# Patient Record
Sex: MALE | Race: WHITE | NOT HISPANIC OR LATINO | Employment: UNEMPLOYED | URBAN - METROPOLITAN AREA
[De-identification: names, ages, dates, MRNs, and addresses within clinical notes are randomized per-mention and may not be internally consistent; named-entity substitution may affect disease eponyms.]

---

## 2023-09-12 ENCOUNTER — TRANSFERRED RECORDS (OUTPATIENT)
Dept: HEALTH INFORMATION MANAGEMENT | Facility: CLINIC | Age: 53
End: 2023-09-12

## 2023-09-28 ENCOUNTER — TRANSFERRED RECORDS (OUTPATIENT)
Dept: HEALTH INFORMATION MANAGEMENT | Facility: CLINIC | Age: 53
End: 2023-09-28

## 2023-09-28 LAB
ALT SERPL-CCNC: 29 IU/L (ref 7–52)
AST SERPL-CCNC: 73 IU/L (ref 13–39)
CREATININE (EXTERNAL): 1.28 MG/DL (ref 0.7–1.3)
GFR ESTIMATED (EXTERNAL): 59 ML/MIN/1.73M2
GFR ESTIMATED (IF AFRICAN AMERICAN) (EXTERNAL): 72 ML/MIN/1.73M2
GLUCOSE (EXTERNAL): 81 MG/DL (ref 70–105)
POTASSIUM (EXTERNAL): 5.1 MEQ/L (ref 3.5–5.1)

## 2023-10-03 ENCOUNTER — TRANSFERRED RECORDS (OUTPATIENT)
Dept: HEALTH INFORMATION MANAGEMENT | Facility: CLINIC | Age: 53
End: 2023-10-03

## 2023-10-03 LAB
ALT SERPL-CCNC: 35 IU/L (ref 7–52)
AST SERPL-CCNC: 78 IU/L (ref 13–39)
CREATININE (EXTERNAL): 1.31 MG/DL (ref 0.7–1.3)
GFR ESTIMATED (EXTERNAL): 57 ML/MIN/1.73M2
GFR ESTIMATED (IF AFRICAN AMERICAN) (EXTERNAL): 70 ML/MIN/1.73M2
GLUCOSE (EXTERNAL): 81 MG/DL (ref 70–105)
POTASSIUM (EXTERNAL): 5.9 MEQ/L (ref 3.5–5.1)

## 2023-10-09 ENCOUNTER — TRANSFERRED RECORDS (OUTPATIENT)
Dept: HEALTH INFORMATION MANAGEMENT | Facility: CLINIC | Age: 53
End: 2023-10-09

## 2023-10-10 ENCOUNTER — TRANSFERRED RECORDS (OUTPATIENT)
Dept: HEALTH INFORMATION MANAGEMENT | Facility: CLINIC | Age: 53
End: 2023-10-10

## 2023-10-10 LAB
ALT SERPL-CCNC: 49 IU/L (ref 7–52)
AST SERPL-CCNC: 86 U/L (ref 13–39)
CREATININE (EXTERNAL): 1.06 MG/DL (ref 0.7–1.3)
GFR ESTIMATED (EXTERNAL): 78 ML/MIN/1.73M2
GFR ESTIMATED (IF AFRICAN AMERICAN) (EXTERNAL): 89 ML/MIN/1.73M2
GLUCOSE (EXTERNAL): 98 MG/DL (ref 70–105)
POTASSIUM (EXTERNAL): 4.1 MEQ/L (ref 3.5–5.1)

## 2023-10-11 ENCOUNTER — TRANSFERRED RECORDS (OUTPATIENT)
Dept: HEALTH INFORMATION MANAGEMENT | Facility: CLINIC | Age: 53
End: 2023-10-11

## 2023-10-11 ENCOUNTER — MEDICAL CORRESPONDENCE (OUTPATIENT)
Dept: HEALTH INFORMATION MANAGEMENT | Facility: CLINIC | Age: 53
End: 2023-10-11

## 2023-10-12 ENCOUNTER — TRANSFERRED RECORDS (OUTPATIENT)
Dept: HEALTH INFORMATION MANAGEMENT | Facility: CLINIC | Age: 53
End: 2023-10-12

## 2023-10-16 ENCOUNTER — TRANSFERRED RECORDS (OUTPATIENT)
Dept: HEALTH INFORMATION MANAGEMENT | Facility: CLINIC | Age: 53
End: 2023-10-16

## 2023-10-16 LAB — EJECTION FRACTION: NORMAL %

## 2023-10-18 ENCOUNTER — TRANSFERRED RECORDS (OUTPATIENT)
Dept: HEALTH INFORMATION MANAGEMENT | Facility: CLINIC | Age: 53
End: 2023-10-18

## 2023-10-18 LAB
ALT SERPL-CCNC: 46 IU/L (ref 7–52)
AST SERPL-CCNC: 65 IU/L (ref 13–39)
CREATININE (EXTERNAL): 1.47 MG/DL (ref 0.7–1.3)
GFR ESTIMATED (EXTERNAL): 50 ML/MIN/1.73M2
GFR ESTIMATED (IF AFRICAN AMERICAN) (EXTERNAL): 61 ML/MIN/1.73M2
GLUCOSE (EXTERNAL): 80 MG/DL (ref 70–105)
POTASSIUM (EXTERNAL): 4.6 MEQ/L (ref 3.5–5.1)
TSH SERPL-ACNC: 1 MIU/ML (ref 0.45–5.33)

## 2023-10-20 ENCOUNTER — REFERRAL (OUTPATIENT)
Dept: TRANSPLANT | Facility: CLINIC | Age: 53
End: 2023-10-20
Payer: COMMERCIAL

## 2023-10-20 DIAGNOSIS — K70.31 ALCOHOLIC CIRRHOSIS OF LIVER WITH ASCITES (H): Primary | ICD-10-CM

## 2023-10-20 NOTE — LETTER
October 24, 2023    Vinicio Chen  [address]  [place] [address]      Dear Vinicio,    Thank you for your interest in the Transplant Center at [facility]. We look forward to being a part of your care team and assisting you through the transplant process.    As we discussed, your transplant coordinator is Geovany Bolton (Liver).  You may call your coordinator at any time with questions or concerns.  Your first scheduled call will be on October 25, 2023 between 10:00 am and 1:00 pm.  If this needs to change, call [phone].    Please complete the following.    Fill out and return the enclosed forms  Authorization for Electronic Communication  Authorization to Discuss Protected Health Information  Authorization for Release of Protected Health Information    Sign up for:  [employer], access to your electronic medical record (see enclosed pamphlet)  [employer], a transplant education website        You can use these tools to learn more about your transplant, communicate with your care team, and track your medical details                Sincerely,      Solid Organ Transplant  [facility]    cc: Referring Physician PCP

## 2023-10-24 VITALS — HEIGHT: 70 IN | BODY MASS INDEX: 24.34 KG/M2 | WEIGHT: 170 LBS

## 2023-10-24 NOTE — TELEPHONE ENCOUNTER
Patient was asked the following questions during liver intake call.      Referring Provider: Nadege Ho  Referring Diagnosis: Decompensated liver failure 2/2 alcoholic liver dz   [facility]     1)Do you know why you have liver disease: alcohol      If Alcoholic Cirrhosis is present when was your last drink:  early August 2023      Have you ever been through treatment for alcohol: no  2) Presence of Ascites: yes Paracentesis: yes  3) Presence of Hepatic Encephalopathy: yes  Medications: yes  4) History of GI Bleeding: no  5) Oxygen Use: no  6) EGD: scanned  7) Colonoscopy: scheduled for first one on Friday, Oct. 27, 2023 @ [facility]  8) MELD Score: MELD-Na (10/9 clinic note 35) no INR available   9)  Labs available for review from PCP/GI:  scanned  10)HCC Diagnosis: no                                         11)Insurance information:              [facility] Medicare Expansion [id number], Policy [id number], Gr [id number]  See scanned records for out of state [place].     Referral intake process completed.  Patient is aware that after financial approval is received, medical records will be requested.   Patient confirmed for a callback from transplant coordinator on October 25, 2023.    Tentative evaluation date TBD.     Confirmed coordinator will discuss evaluation process in more detail at the time of their call.   Patient is aware of the need to arrange age appropriate cancer screening, vaccinations, and dental care.  Reminded patient to complete questionnaire, complete medical records release, and review packet prior to evaluation visit .  Assessed patient for special needs (ie--wheelchair, assistance, guardian, and ):   wheelchair needed for distance walking  Patient instructed to call [phone] with questions.      Patient gave verbal consent during intake call to obtain medical records and documents outside of [facility]:   yes

## 2023-10-25 ENCOUNTER — TELEPHONE (OUTPATIENT)
Dept: TRANSPLANT | Facility: CLINIC | Age: 53
End: 2023-10-25
Payer: COMMERCIAL

## 2023-10-25 DIAGNOSIS — K76.6 PORTAL HYPERTENSION (H): ICD-10-CM

## 2023-10-25 DIAGNOSIS — K70.31 ALCOHOLIC CIRRHOSIS OF LIVER WITH ASCITES (H): Primary | ICD-10-CM

## 2023-10-25 DIAGNOSIS — Z87.891 FORMER SMOKELESS TOBACCO USE: ICD-10-CM

## 2023-10-25 NOTE — TELEPHONE ENCOUNTER
"LIVER DISEASE ETOH  REFERRING PROVIDER  [facility]    [facility]  ---------------------------------------------------------------------------------------------  MELD 10/3/23  33  Creat 1.31  Bili  20  (9/15/23 10.3; 9/28/23 18.8)  Na 122  INR 2.3    HISTORY OF LIVER DISEASE  Inpt late July early August 2023 and found out he had liver disease. Was healthy prior to and did not have a PCP.  Had quit alcohol a little prior to that first hospitalization stay, \"too expensive..was never a knock down drunk\" .No previous legal issues or social issues related to ETOH.  Admitted again 9/12/23 with HE    Set up for colonoscopy 10/27   last week    Ascites- Tapped x 2 , thora x 1 ,diuretics currently on hold due kidney  HE- on lactulose  Kidney function \"stage 3\"  Variceal screening Last EGD. 10/11/23 grade 1 EV  HCC Screening MRCP 10/12/23- nodule, needs follow up on  ----------------------------------------------------------------------------------------------  PMH-  Anxiety, gout  Knee surgeries  30 yr smokeless tobacco, recently quit  Needs dental  SHX  Lives alone, one brother, Geovany, lives in same town and is supportive.  Has a best friend, Mark C[employer] worker, previously was caregiver to mother (alzheimer) who is now nursing home  ----------------------------------------------------------------------------  PLAN- new diagnosis of cirrhosis -full eval once insurance confirmed, aiming for 11/7/23.     Request recent records from [facility]         "

## 2023-11-15 ENCOUNTER — TELEPHONE (OUTPATIENT)
Dept: TRANSPLANT | Facility: CLINIC | Age: 53
End: 2023-11-15
Payer: COMMERCIAL

## 2023-11-15 NOTE — TELEPHONE ENCOUNTER
After verifying with financial case coordinator, Millicent, message left letting Vinicio know he will be receiving a letter, as well as his care team, letting all know that approved insurance coverage for a transplant evaluation is needed prior to proceeding with. Therefore, closing referral until then.    To call the transplant team as needed with questions or concerns.    MAXINE BrownN,RN  Adult Liver Coordinator

## 2023-11-15 NOTE — LETTER
Vinicio Chen  [address] [place] [address]    November 15, 2023    Dear Vinicio    This letter is being sent to notify you that we were unable to proceed with a transplant evaluation due to lack of insurance coverage.  We have closed your referral at this time.    If you obtain insurance coverage, or would like to discuss this matter, please contact the Transplant Office at [phone] or call [phone].      Please continue to be medically managed by your local gastroenterologist.      Sincerely,  Georgina Burnett, MAXINEN,RN  Adult Liver Coordinator

## 2023-12-31 ENCOUNTER — HEALTH MAINTENANCE LETTER (OUTPATIENT)
Age: 53
End: 2023-12-31